# Patient Record
Sex: MALE | Race: WHITE | URBAN - METROPOLITAN AREA
[De-identification: names, ages, dates, MRNs, and addresses within clinical notes are randomized per-mention and may not be internally consistent; named-entity substitution may affect disease eponyms.]

---

## 2020-06-22 ENCOUNTER — OFFICE VISIT (OUTPATIENT)
Dept: HEMATOLOGY | Age: 60
End: 2020-06-22

## 2020-06-22 VITALS
HEIGHT: 68 IN | BODY MASS INDEX: 26.98 KG/M2 | DIASTOLIC BLOOD PRESSURE: 52 MMHG | OXYGEN SATURATION: 100 % | RESPIRATION RATE: 18 BRPM | TEMPERATURE: 97.6 F | SYSTOLIC BLOOD PRESSURE: 90 MMHG | WEIGHT: 178 LBS | HEART RATE: 61 BPM

## 2020-06-22 DIAGNOSIS — B18.2 CHRONIC HEPATITIS C WITHOUT HEPATIC COMA (HCC): Primary | ICD-10-CM

## 2020-06-22 PROBLEM — I10 HTN (HYPERTENSION): Status: ACTIVE | Noted: 2020-06-22

## 2020-06-22 PROBLEM — E11.9 DM (DIABETES MELLITUS) (HCC): Status: ACTIVE | Noted: 2020-06-22

## 2020-06-22 PROBLEM — E78.5 HYPERLIPIDEMIA: Status: ACTIVE | Noted: 2020-06-22

## 2020-06-22 RX ORDER — METFORMIN HYDROCHLORIDE 500 MG/1
TABLET ORAL
COMMUNITY
Start: 2020-04-29

## 2020-06-22 RX ORDER — SIMVASTATIN 10 MG/1
TABLET, FILM COATED ORAL
COMMUNITY
Start: 2020-04-29

## 2020-06-22 RX ORDER — CARVEDILOL 25 MG/1
TABLET ORAL
COMMUNITY
Start: 2020-05-05

## 2020-06-22 RX ORDER — LISINOPRIL AND HYDROCHLOROTHIAZIDE 12.5; 2 MG/1; MG/1
TABLET ORAL
COMMUNITY
Start: 2020-05-05

## 2020-06-22 NOTE — PROGRESS NOTES
[address], Nba TREVIÑO Cite Mongi Slim, [place] Plenty, MD Darlynn Mirza, [place], [facility]     Faviola Hernandez, [facility]   Clotilde Irby NAPOLEON-OBED Rico, [facility]       Jozef Ramos Juan De Burr 136    at [address], [facility] Lesly Hurley  22.    [phone]    FAX: [address] [place] [address], [address]    [phone]    FAX: [phone]       Patient Care Team:  Timbo Dumont MD (Gastroenterology)       The physicians listed above have asked me to see Ella Sullivan in consultation regarding chronic HCV and to perform assessment of fibrosis by means of in-office fibroscan analysis. There were no records available for review at the time of the office visit. The patient is a 60 y.o.  male who was found to have liver disease on labs drawn through [facility] in the past.  He reports that there was some concern that he \"had HCV. \"  He has since seen Dr Bisi Gavin some time ago who had requested this study with Fibroscan. This has been delayed for several months due to concerns regarding COVID-19. The patient has no symptoms which can be attributed to the liver disorder. The patient completes all daily activities without any functional limitations.     ALLERGIES:  Allergies not on file    MEDICATIONS:  Current Outpatient Medications   Medication Sig    carvediloL (COREG) 25 mg tablet TAKE 1 TABLET BY MOUTH TWICE DAILY    lisinopril-hydroCHLOROthiazide (PRINZIDE, ZESTORETIC) 20-12.5 mg per tablet TAKE 1 TABLET BY MOUTH ONCE DAILY    metFORMIN (GLUCOPHAGE) 500 mg tablet TAKE 1 TABLET BY MOUTH TWICE DAILY WITH MEALS    simvastatin (ZOCOR) 10 mg tablet TAKE 1 TABLET BY MOUTH EVERY DAY AT BEDTIME     No current facility-administered medications for this visit. SYSTEM REVIEW NOT RELATED TO LIVER DISEASE OR REVIEWED ABOVE:  Constitution systems: Negative for fever, chills, weight gain, weight loss. Eyes: Negative for visual changes. ENT: Negative for sore throat, painful swallowing. Respiratory: Negative for cough, hemoptysis, SOB. Cardiology: Negative for chest pain, palpitations. GI:  Negative for constipation or diarrhea. : Negative for urinary frequency, dysuria, hematuria, nocturia. Skin: Negative for rash. Hematology: Negative for easy bruising, blood clots. Musculo-skeletal: Negative for back pain, muscle pain, weakness. Neurologic: Negative for headaches, dizziness, vertigo, memory problems not related to HE. Psychology: Negative for anxiety, depression. FAMILY HISTORY:  The father  of heart issues. The mother  of unknown cause. There is no family history of liver disease. SOCIAL HISTORY:  The patient single. The patient has no children. The patient currently smokes 1 pack of tobacco daily. The patient consumes 1-2 alcoholic beverages per day, this is a long-standing pattern. The patient currently works full time as cemetery caretaker. PHYSICAL EXAMINATION:  Visit Vitals  BP 90/52   Pulse 61   Temp 97.6 °F (36.4 °C) (Temporal)   Resp 18   Ht 5' 8\" (1.727 m)   Wt 178 lb (80.7 kg)   SpO2 100%   BMI 27.06 kg/m²     General: No acute distress. Skin: No spider angiomata. No jaundice. No palmar erythema. Abdomen: Soft non-tender. Liver size normal to percussion/palpation. Spleen not palpable. No obvious ascites. Extremities: No edema. No muscle wasting. No gross arthritic changes. Neurologic: Alert and oriented. Cranial nerves grossly intact. No asterixis. LABORATORY STUDIES:  None available. SEROLOGIES:  Not available or performed. LIVER HISTOLOGY:  2020. FibroScan performed at [address]  [place] was 21.4. IQR/med 9%. . The results suggested a fibrosis level of F4. The CAP score suggests no evidence of fatty liver. ENDOSCOPIC PROCEDURES:  Not available or performed    RADIOLOGY:  Not available or performed    OTHER TESTING:  Not available or performed    ASSESSMENT AND PLAN:  Chronic HCV with cirrhosis suggested on the Fibroscan in the office today. Assessment of fibrosis was made through performance of fibroscan in the office today. The results of the analysis were shared with the patient in the office at the time of the procedure. A copy of the report was provided to the patient and will be forwarded to the referring medical practice. All questions were answered. The patient expressed a clear understanding of the above. I have recommended that he follow-up with Dr Antionette Noel as soon as available to discuss results, need for additional screening studies and whether treatment for HCV is appropriate for him at this time. FOLLOW-UP:  All of the issues listed above in the Assessment and Plan were discussed with the patient. All questions were answered. The patient expressed a clear understanding of the above. The patient will follow-up with the referring physician.     Celeste Alvarado PA-C  Liver [place] [facility] [address] [facility] Karoline Leslieoswaldoshaunna  22.  [phone]  [address]

## 2020-07-01 ENCOUNTER — DOCUMENTATION ONLY (OUTPATIENT)
Dept: HEMATOLOGY | Age: 60
End: 2020-07-01

## 2022-03-18 PROBLEM — B18.2 CHRONIC HEPATITIS C WITHOUT HEPATIC COMA (HCC): Status: ACTIVE | Noted: 2020-06-22

## 2022-03-19 PROBLEM — I10 HTN (HYPERTENSION): Status: ACTIVE | Noted: 2020-06-22

## 2022-03-19 PROBLEM — E11.9 DM (DIABETES MELLITUS) (HCC): Status: ACTIVE | Noted: 2020-06-22

## 2022-03-19 PROBLEM — E78.5 HYPERLIPIDEMIA: Status: ACTIVE | Noted: 2020-06-22

## 2023-05-18 RX ORDER — LISINOPRIL AND HYDROCHLOROTHIAZIDE 20; 12.5 MG/1; MG/1
1 TABLET ORAL DAILY
COMMUNITY
Start: 2020-05-05

## 2023-05-18 RX ORDER — CARVEDILOL 25 MG/1
1 TABLET ORAL 2 TIMES DAILY
COMMUNITY
Start: 2020-05-05

## 2023-05-18 RX ORDER — SIMVASTATIN 10 MG
1 TABLET ORAL NIGHTLY
COMMUNITY
Start: 2020-04-29